# Patient Record
Sex: FEMALE | Race: WHITE | HISPANIC OR LATINO
[De-identification: names, ages, dates, MRNs, and addresses within clinical notes are randomized per-mention and may not be internally consistent; named-entity substitution may affect disease eponyms.]

---

## 2017-08-01 ENCOUNTER — RESULT REVIEW (OUTPATIENT)
Age: 27
End: 2017-08-01

## 2018-09-04 ENCOUNTER — INPATIENT (INPATIENT)
Facility: HOSPITAL | Age: 28
LOS: 1 days | Discharge: HOME | End: 2018-09-06
Attending: OBSTETRICS & GYNECOLOGY | Admitting: OBSTETRICS & GYNECOLOGY
Payer: COMMERCIAL

## 2018-09-04 VITALS — HEIGHT: 58 IN | WEIGHT: 121.25 LBS

## 2018-09-04 DIAGNOSIS — Z34.80 ENCOUNTER FOR SUPERVISION OF OTHER NORMAL PREGNANCY, UNSPECIFIED TRIMESTER: ICD-10-CM

## 2018-09-04 DIAGNOSIS — Z3A.00 WEEKS OF GESTATION OF PREGNANCY NOT SPECIFIED: ICD-10-CM

## 2018-09-04 DIAGNOSIS — O26.899 OTHER SPECIFIED PREGNANCY RELATED CONDITIONS, UNSPECIFIED TRIMESTER: ICD-10-CM

## 2018-09-04 LAB
BASOPHILS # BLD AUTO: 0 K/UL — SIGNIFICANT CHANGE UP (ref 0–0.2)
BASOPHILS NFR BLD AUTO: 0.6 % — SIGNIFICANT CHANGE UP (ref 0–2)
BLD GP AB SCN SERPL QL: NEGATIVE — SIGNIFICANT CHANGE UP
EOSINOPHIL # BLD AUTO: 0.1 K/UL — SIGNIFICANT CHANGE UP (ref 0–0.5)
EOSINOPHIL NFR BLD AUTO: 1.4 % — SIGNIFICANT CHANGE UP (ref 0–6)
HCT VFR BLD CALC: 38.2 % — SIGNIFICANT CHANGE UP (ref 34.5–45)
HGB BLD-MCNC: 12.6 G/DL — SIGNIFICANT CHANGE UP (ref 11.5–15.5)
LYMPHOCYTES # BLD AUTO: 1.5 K/UL — SIGNIFICANT CHANGE UP (ref 1–3.3)
LYMPHOCYTES # BLD AUTO: 22.6 % — SIGNIFICANT CHANGE UP (ref 13–44)
MCHC RBC-ENTMCNC: 26.3 PG — LOW (ref 27–34)
MCHC RBC-ENTMCNC: 33 GM/DL — SIGNIFICANT CHANGE UP (ref 32–36)
MCV RBC AUTO: 79.8 FL — LOW (ref 80–100)
MONOCYTES # BLD AUTO: 0.6 K/UL — SIGNIFICANT CHANGE UP (ref 0–0.9)
MONOCYTES NFR BLD AUTO: 8.8 % — SIGNIFICANT CHANGE UP (ref 2–14)
NEUTROPHILS # BLD AUTO: 4.5 K/UL — SIGNIFICANT CHANGE UP (ref 1.8–7.4)
NEUTROPHILS NFR BLD AUTO: 66.6 % — SIGNIFICANT CHANGE UP (ref 43–77)
PLATELET # BLD AUTO: 268 K/UL — SIGNIFICANT CHANGE UP (ref 150–400)
RBC # BLD: 4.79 M/UL — SIGNIFICANT CHANGE UP (ref 3.8–5.2)
RBC # FLD: 16.7 % — HIGH (ref 10.3–14.5)
RH IG SCN BLD-IMP: POSITIVE — SIGNIFICANT CHANGE UP
T PALLIDUM AB TITR SER: NEGATIVE — SIGNIFICANT CHANGE UP
WBC # BLD: 6.8 K/UL — SIGNIFICANT CHANGE UP (ref 3.8–10.5)
WBC # FLD AUTO: 6.8 K/UL — SIGNIFICANT CHANGE UP (ref 3.8–10.5)

## 2018-09-04 RX ORDER — DIBUCAINE 1 %
1 OINTMENT (GRAM) RECTAL EVERY 4 HOURS
Qty: 0 | Refills: 0 | Status: DISCONTINUED | OUTPATIENT
Start: 2018-09-04 | End: 2018-09-06

## 2018-09-04 RX ORDER — OXYTOCIN 10 UNIT/ML
4 VIAL (ML) INJECTION
Qty: 30 | Refills: 0 | Status: DISCONTINUED | OUTPATIENT
Start: 2018-09-04 | End: 2018-09-04

## 2018-09-04 RX ORDER — PRAMOXINE HYDROCHLORIDE 150 MG/15G
1 AEROSOL, FOAM RECTAL EVERY 4 HOURS
Qty: 0 | Refills: 0 | Status: DISCONTINUED | OUTPATIENT
Start: 2018-09-04 | End: 2018-09-04

## 2018-09-04 RX ORDER — AMPICILLIN TRIHYDRATE 250 MG
CAPSULE ORAL
Qty: 0 | Refills: 0 | Status: DISCONTINUED | OUTPATIENT
Start: 2018-09-04 | End: 2018-09-04

## 2018-09-04 RX ORDER — OXYCODONE HYDROCHLORIDE 5 MG/1
5 TABLET ORAL EVERY 4 HOURS
Qty: 0 | Refills: 0 | Status: DISCONTINUED | OUTPATIENT
Start: 2018-09-04 | End: 2018-09-06

## 2018-09-04 RX ORDER — AMPICILLIN TRIHYDRATE 250 MG
2 CAPSULE ORAL ONCE
Qty: 0 | Refills: 0 | Status: COMPLETED | OUTPATIENT
Start: 2018-09-04 | End: 2018-09-04

## 2018-09-04 RX ORDER — ACETAMINOPHEN 500 MG
975 TABLET ORAL EVERY 6 HOURS
Qty: 0 | Refills: 0 | Status: DISCONTINUED | OUTPATIENT
Start: 2018-09-04 | End: 2018-09-06

## 2018-09-04 RX ORDER — HYDROCORTISONE 1 %
1 OINTMENT (GRAM) TOPICAL EVERY 4 HOURS
Qty: 0 | Refills: 0 | Status: DISCONTINUED | OUTPATIENT
Start: 2018-09-04 | End: 2018-09-04

## 2018-09-04 RX ORDER — SODIUM CHLORIDE 9 MG/ML
500 INJECTION, SOLUTION INTRAVENOUS ONCE
Qty: 0 | Refills: 0 | Status: DISCONTINUED | OUTPATIENT
Start: 2018-09-04 | End: 2018-09-04

## 2018-09-04 RX ORDER — SODIUM CHLORIDE 9 MG/ML
3 INJECTION INTRAMUSCULAR; INTRAVENOUS; SUBCUTANEOUS EVERY 8 HOURS
Qty: 0 | Refills: 0 | Status: DISCONTINUED | OUTPATIENT
Start: 2018-09-04 | End: 2018-09-04

## 2018-09-04 RX ORDER — IBUPROFEN 200 MG
600 TABLET ORAL EVERY 6 HOURS
Qty: 0 | Refills: 0 | Status: DISCONTINUED | OUTPATIENT
Start: 2018-09-04 | End: 2018-09-06

## 2018-09-04 RX ORDER — DIPHENHYDRAMINE HCL 50 MG
25 CAPSULE ORAL EVERY 6 HOURS
Qty: 0 | Refills: 0 | Status: DISCONTINUED | OUTPATIENT
Start: 2018-09-04 | End: 2018-09-06

## 2018-09-04 RX ORDER — AER TRAVELER 0.5 G/1
1 SOLUTION RECTAL; TOPICAL EVERY 4 HOURS
Qty: 0 | Refills: 0 | Status: DISCONTINUED | OUTPATIENT
Start: 2018-09-04 | End: 2018-09-06

## 2018-09-04 RX ORDER — CITRIC ACID/SODIUM CITRATE 300-500 MG
15 SOLUTION, ORAL ORAL EVERY 4 HOURS
Qty: 0 | Refills: 0 | Status: DISCONTINUED | OUTPATIENT
Start: 2018-09-04 | End: 2018-09-04

## 2018-09-04 RX ORDER — PRAMOXINE HYDROCHLORIDE 150 MG/15G
1 AEROSOL, FOAM RECTAL EVERY 4 HOURS
Qty: 0 | Refills: 0 | Status: DISCONTINUED | OUTPATIENT
Start: 2018-09-04 | End: 2018-09-06

## 2018-09-04 RX ORDER — SODIUM CHLORIDE 9 MG/ML
1000 INJECTION, SOLUTION INTRAVENOUS
Qty: 0 | Refills: 0 | Status: DISCONTINUED | OUTPATIENT
Start: 2018-09-04 | End: 2018-09-04

## 2018-09-04 RX ORDER — OXYTOCIN 10 UNIT/ML
41.67 VIAL (ML) INJECTION
Qty: 20 | Refills: 0 | Status: DISCONTINUED | OUTPATIENT
Start: 2018-09-04 | End: 2018-09-06

## 2018-09-04 RX ORDER — INFLUENZA VIRUS VACCINE 15; 15; 15; 15 UG/.5ML; UG/.5ML; UG/.5ML; UG/.5ML
0.5 SUSPENSION INTRAMUSCULAR ONCE
Qty: 0 | Refills: 0 | Status: DISCONTINUED | OUTPATIENT
Start: 2018-09-04 | End: 2018-09-06

## 2018-09-04 RX ORDER — OXYTOCIN 10 UNIT/ML
333.33 VIAL (ML) INJECTION
Qty: 20 | Refills: 0 | Status: DISCONTINUED | OUTPATIENT
Start: 2018-09-04 | End: 2018-09-04

## 2018-09-04 RX ORDER — AER TRAVELER 0.5 G/1
1 SOLUTION RECTAL; TOPICAL EVERY 4 HOURS
Qty: 0 | Refills: 0 | Status: DISCONTINUED | OUTPATIENT
Start: 2018-09-04 | End: 2018-09-04

## 2018-09-04 RX ORDER — GLYCERIN ADULT
1 SUPPOSITORY, RECTAL RECTAL AT BEDTIME
Qty: 0 | Refills: 0 | Status: DISCONTINUED | OUTPATIENT
Start: 2018-09-04 | End: 2018-09-06

## 2018-09-04 RX ORDER — DOCUSATE SODIUM 100 MG
100 CAPSULE ORAL
Qty: 0 | Refills: 0 | Status: DISCONTINUED | OUTPATIENT
Start: 2018-09-04 | End: 2018-09-06

## 2018-09-04 RX ORDER — SIMETHICONE 80 MG/1
80 TABLET, CHEWABLE ORAL EVERY 6 HOURS
Qty: 0 | Refills: 0 | Status: DISCONTINUED | OUTPATIENT
Start: 2018-09-04 | End: 2018-09-06

## 2018-09-04 RX ORDER — AMPICILLIN TRIHYDRATE 250 MG
1 CAPSULE ORAL EVERY 4 HOURS
Qty: 0 | Refills: 0 | Status: DISCONTINUED | OUTPATIENT
Start: 2018-09-04 | End: 2018-09-04

## 2018-09-04 RX ORDER — ACETAMINOPHEN 500 MG
1000 TABLET ORAL EVERY 6 HOURS
Qty: 0 | Refills: 0 | Status: DISCONTINUED | OUTPATIENT
Start: 2018-09-04 | End: 2018-09-04

## 2018-09-04 RX ORDER — DIBUCAINE 1 %
1 OINTMENT (GRAM) RECTAL EVERY 4 HOURS
Qty: 0 | Refills: 0 | Status: DISCONTINUED | OUTPATIENT
Start: 2018-09-04 | End: 2018-09-04

## 2018-09-04 RX ORDER — SODIUM CHLORIDE 9 MG/ML
3 INJECTION INTRAMUSCULAR; INTRAVENOUS; SUBCUTANEOUS EVERY 8 HOURS
Qty: 0 | Refills: 0 | Status: DISCONTINUED | OUTPATIENT
Start: 2018-09-04 | End: 2018-09-06

## 2018-09-04 RX ORDER — MAGNESIUM HYDROXIDE 400 MG/1
30 TABLET, CHEWABLE ORAL
Qty: 0 | Refills: 0 | Status: DISCONTINUED | OUTPATIENT
Start: 2018-09-04 | End: 2018-09-06

## 2018-09-04 RX ORDER — LANOLIN
1 OINTMENT (GRAM) TOPICAL EVERY 6 HOURS
Qty: 0 | Refills: 0 | Status: DISCONTINUED | OUTPATIENT
Start: 2018-09-04 | End: 2018-09-06

## 2018-09-04 RX ORDER — OXYCODONE HYDROCHLORIDE 5 MG/1
5 TABLET ORAL
Qty: 0 | Refills: 0 | Status: DISCONTINUED | OUTPATIENT
Start: 2018-09-04 | End: 2018-09-06

## 2018-09-04 RX ORDER — KETOROLAC TROMETHAMINE 30 MG/ML
30 SYRINGE (ML) INJECTION ONCE
Qty: 0 | Refills: 0 | Status: DISCONTINUED | OUTPATIENT
Start: 2018-09-04 | End: 2018-09-04

## 2018-09-04 RX ORDER — TETANUS TOXOID, REDUCED DIPHTHERIA TOXOID AND ACELLULAR PERTUSSIS VACCINE, ADSORBED 5; 2.5; 8; 8; 2.5 [IU]/.5ML; [IU]/.5ML; UG/.5ML; UG/.5ML; UG/.5ML
0.5 SUSPENSION INTRAMUSCULAR ONCE
Qty: 0 | Refills: 0 | Status: DISCONTINUED | OUTPATIENT
Start: 2018-09-04 | End: 2018-09-06

## 2018-09-04 RX ORDER — HYDROCORTISONE 1 %
1 OINTMENT (GRAM) TOPICAL EVERY 4 HOURS
Qty: 0 | Refills: 0 | Status: DISCONTINUED | OUTPATIENT
Start: 2018-09-04 | End: 2018-09-06

## 2018-09-04 RX ORDER — OXYTOCIN 10 UNIT/ML
41.67 VIAL (ML) INJECTION
Qty: 20 | Refills: 0 | Status: DISCONTINUED | OUTPATIENT
Start: 2018-09-04 | End: 2018-09-04

## 2018-09-04 RX ADMIN — Medication 600 MILLIGRAM(S): at 18:15

## 2018-09-04 RX ADMIN — Medication 216 GRAM(S): at 10:20

## 2018-09-04 RX ADMIN — Medication 975 MILLIGRAM(S): at 18:17

## 2018-09-04 RX ADMIN — Medication 30 MILLIGRAM(S): at 13:08

## 2018-09-04 NOTE — PATIENT PROFILE OB - NS PRO TDAP CONTRAINDICATIONS
IP Acute Occupational Therapy Discharge Summary  Plan of Care Note  Patient seen on 2 [facility]    SUBJECTIVE: Subjective: \"I want to get dressed. I am going home today.\" (11/29/17 1539)    Diagnosis:  1. Acute respiratory failure with hypoxia (CMS/HCC)    2. Acute on chronic congestive heart failure, unspecified congestive heart failure type (CMS/HCC)    3. Hyperkalemia    4. Acute on chronic diastolic congestive heart failure (CMS/HCC)    5. Cor pulmonale, acute (CMS/HCC)        Co morbidities:   Patient Active Problem List   Diagnosis   • MALIG NEOPLASM BRONCH/LUNG NOS   • Drug-induced neutropenia (CMS/HCC)   • Other and unspecified hyperlipidemia   • Gastric cancer (CMS/HCC)   • Acute bacterial bronchitis   • Iron malabsorption   • Hypoxia   • COPD, severe (CMS/HCC)   • Acquired hemolytic anemia (CMS/HCC)   • Weight loss, unintentional   • Benign essential hypertension   • Acute on chronic diastolic congestive heart failure (CMS/HCC)       Precautions:       Prior Living Situation:  Type of Home: House (11/27/17 0900)  Lives With: Spouse (11/27/17 0900)  Bathing: Independent (11/27/17 0900)  Upper Body Dressing: Independent (11/27/17 0900)  Lower Body Dressing: Independent (11/27/17 0900)  Toileting: Independent (11/27/17 0900)    Objective:  Below is key objective and subjective information from the last 24 hours.  For further details and goals, please refer to the OT Assess/Treat/Goals flowsheet.    ADLs:  Self Cares/ADL's  Upper Body Dressing Assistance: Modified independent;Edge of bed (11/29/17 1539)  Lower Body Clothing Assistance: Modified independent;Edge of bed (11/29/17 1539)  Footwear Assistance: Modified independent;Edge of bed (11/29/17 1539)  Toileting Assistance: Modified independent (11/29/17 1539)  Self Cares/ADL's Comments #1: Provided handouts on energy conservation and pacing to minimize dyspnea with activity; wife asking if patient can use oxygen in shower and therapist confirmed  that he could; reinforced use of shower bench for seated showering; reinforced pacing and pursed lip breathing with activity (11/29/17 1539)    Household Mobility:  Household Mobility  Sit to Stand: Independent (11/29/17 1539)  Stand to Sit: Independent (11/29/17 1539)  Stand Pivot Transfers: Independent (11/29/17 1539)  Toilet Transfers: Independent (11/29/17 1539)  Sitting - Dynamic: Independent (11/29/17 1539)  Standing - Static: Independent (11/29/17 1539)  Standing - Dynamic: Independent (11/29/17 1539)  Household Mobility Comments #1: No device needed for mobility; finalized education in pacing and pursed lip breathing during room ambulation (11/29/17 1539)    Home Management:       Assessment: Patient presents near to or at baseline which was independent with mobility  and ADLs . Emphasis of session included finalizing education in pacing, energy conservation and pursed lip breathing during completion of dressing self cares, room ambulation and item retrieval skills. Patient requiring use of 5L oxygen with activity and oxygen saturation 88% to 89%. Achieved level of modified to full independence for self care task completion with occasional rest breaks due to dyspnea though no physical assist needed. Patient encouraged to use a shower bench for seated showering and verbalizes good understanding. Goals met and no further OT intervention is needed at this time. D/C OT.     OT Identified Barriers to Discharge: dyspnea    Education:   On this date, the patient and patient's spouse was educated on self care adaptations; energy conservation    The response to education was: Verbalizes understanding and Demonstrates understanding.    Equipment:  PT/OT ADL Equipment for Discharge: shower bench (11/29/17 1539)       PLAN:      Frequency Comments: D/C OT; all goals met and teaching completed; patient being discharged home later today (11/29/17 1539)      GOALS:  Short Term Goals to Be Reviewed On: 11/29/17  Goal  Agreement: Patient agrees with goals and treatment plan              Grooming Discharge Goal 1: Indpendent performed sinkside  Grooming Discharge Goal Progress 1: Outcome met, complete goal     Bathing Discharge Goal 1: Independent full body sponge bathing  Bathing Discharge Goal Progress 1: Outcome met, complete goal              Dressing Discharge Goal 1: Independent full body dressing  Dressing Discharge Goal Progress 1: Outcome met, complete goal              Toileting Discharge Goal Progress 1: Outcome met, complete goal              Home Management Discharge Goal 1: Indepenent item transport and retrieval skills while incorporating pacing and energy conservation techniques            RECOMMENDATIONS FOR DISCHARGE:  Recommendations for Discharge: OT: Home     Treatment Time:  OT Time Spent: 28 minutes (11/29/17 5654)   had less than two years ago

## 2018-09-05 LAB
HCT VFR BLD CALC: 36.2 % — SIGNIFICANT CHANGE UP (ref 34.5–45)
HGB BLD-MCNC: 11.6 G/DL — SIGNIFICANT CHANGE UP (ref 11.5–15.5)

## 2018-09-05 RX ADMIN — Medication 600 MILLIGRAM(S): at 01:15

## 2018-09-05 RX ADMIN — Medication 975 MILLIGRAM(S): at 00:35

## 2018-09-05 RX ADMIN — Medication 1 TABLET(S): at 12:14

## 2018-09-05 RX ADMIN — Medication 975 MILLIGRAM(S): at 01:15

## 2018-09-05 RX ADMIN — Medication 975 MILLIGRAM(S): at 12:13

## 2018-09-05 RX ADMIN — Medication 600 MILLIGRAM(S): at 12:14

## 2018-09-05 RX ADMIN — Medication 600 MILLIGRAM(S): at 00:36

## 2018-09-05 NOTE — PROGRESS NOTE ADULT - SUBJECTIVE AND OBJECTIVE BOX
OB Progress Note:  PPD#1    S: 27yo  PPD#1 s/p . Patient feels well. Pain is well controlled. She is tolerating a regular diet and passing flatus. She is voiding spontaneously, and ambulating without difficulty. Denies CP/SOB. Denies lightheadedness/dizziness. Denies N/V.    O:  Vitals:  Vital Signs Last 24 Hrs  T(C): 36.6 (05 Sep 2018 06:17), Max: 36.8 (04 Sep 2018 21:35)  T(F): 97.9 (05 Sep 2018 06:17), Max: 98.2 (04 Sep 2018 21:35)  HR: 70 (05 Sep 2018 06:17) (63 - 87)  BP: 92/63 (05 Sep 2018 06:17) (91/60 - 111/55)  BP(mean): --  RR: 18 (05 Sep 2018 06:17) (18 - 18)  SpO2: 98% (05 Sep 2018 00:59) (96% - 100%)    MEDICATIONS  (STANDING):  acetaminophen   Tablet .. 975 milliGRAM(s) Oral every 6 hours  diphtheria/tetanus/pertussis (acellular) Vaccine (ADAcel) 0.5 milliLiter(s) IntraMuscular once  ibuprofen  Tablet. 600 milliGRAM(s) Oral every 6 hours  influenza   Vaccine 0.5 milliLiter(s) IntraMuscular once  oxyCODONE    IR 5 milliGRAM(s) Oral every 3 hours  oxytocin Infusion 41.667 milliUNIT(s)/Min (125 mL/Hr) IV Continuous <Continuous>  prenatal multivitamin 1 Tablet(s) Oral daily  sodium chloride 0.9% lock flush 3 milliLiter(s) IV Push every 8 hours      Physical Exam:  General: NAD  Abdomen: soft, non-tender, non-distended, fundus firm  Vaginal: Lochia wnl  Extremities: No erythema/edema    Labs:  Blood type: A Positive  Rubella IgG: RPR: Negative                          12.6   6.8 >-----------< 268    (  @ 10:26 )             38.2

## 2018-09-05 NOTE — PROGRESS NOTE ADULT - PROBLEM SELECTOR PLAN 1
- Pain well controlled, continue current pain regimen  - Increase ambulation  - Continue regular diet    Angie Blackman PGY-1

## 2018-09-06 ENCOUNTER — TRANSCRIPTION ENCOUNTER (OUTPATIENT)
Age: 28
End: 2018-09-06

## 2018-09-06 VITALS
RESPIRATION RATE: 18 BRPM | SYSTOLIC BLOOD PRESSURE: 124 MMHG | HEART RATE: 63 BPM | TEMPERATURE: 98 F | DIASTOLIC BLOOD PRESSURE: 61 MMHG

## 2018-09-06 PROCEDURE — 85014 HEMATOCRIT: CPT

## 2018-09-06 PROCEDURE — 86780 TREPONEMA PALLIDUM: CPT

## 2018-09-06 PROCEDURE — 85018 HEMOGLOBIN: CPT

## 2018-09-06 PROCEDURE — 59025 FETAL NON-STRESS TEST: CPT

## 2018-09-06 PROCEDURE — 59050 FETAL MONITOR W/REPORT: CPT

## 2018-09-06 PROCEDURE — 85027 COMPLETE CBC AUTOMATED: CPT

## 2018-09-06 PROCEDURE — 86850 RBC ANTIBODY SCREEN: CPT

## 2018-09-06 PROCEDURE — 86900 BLOOD TYPING SEROLOGIC ABO: CPT

## 2018-09-06 PROCEDURE — G0463: CPT

## 2018-09-06 PROCEDURE — 86901 BLOOD TYPING SEROLOGIC RH(D): CPT

## 2018-09-06 RX ORDER — IBUPROFEN 200 MG
1 TABLET ORAL
Qty: 0 | Refills: 0 | COMMUNITY
Start: 2018-09-06

## 2018-09-06 RX ORDER — FOLIC ACID 0.8 MG
0 TABLET ORAL
Qty: 0 | Refills: 0 | COMMUNITY

## 2018-09-06 RX ORDER — FERROUS SULFATE 325(65) MG
0 TABLET ORAL
Qty: 0 | Refills: 0 | COMMUNITY

## 2018-09-06 RX ORDER — VITAMIN E 100 UNIT
0 CAPSULE ORAL
Qty: 0 | Refills: 0 | COMMUNITY

## 2018-09-06 RX ADMIN — Medication 600 MILLIGRAM(S): at 06:37

## 2018-09-06 RX ADMIN — Medication 600 MILLIGRAM(S): at 06:07

## 2018-09-06 NOTE — DISCHARGE NOTE OB - CARE PLAN
Principal Discharge DX:	Vaginal delivery  Goal:	post partum care  Assessment and plan of treatment:	routine care

## 2018-09-06 NOTE — DISCHARGE NOTE OB - PATIENT PORTAL LINK FT
You can access the [facility] Patient Portal, offered by [facility], by registering with the following website: [email or web address]

## 2018-09-06 NOTE — DISCHARGE NOTE OB - MATERIALS PROVIDED
Vaccinations/Breastfeeding Log/Bottle Feeding Log/[facility] Hearing Screen Program/Breastfeeding Mother’s Support Group Information/Guide to Postpartum Care/Breastfeeding Guide and Packet/Birth Certificate Instructions/[facility] [place] Screening Program/  Immunization Record/Shaken Baby Prevention Handout/Back To Sleep Handout

## 2018-09-06 NOTE — DISCHARGE NOTE OB - CARE PROVIDER_API CALL
Jos Steve (MD), Obstetrics and Gynecology  [address] [place] [address]  Phone: [phone]  Fax: [phone]

## 2018-12-27 NOTE — PATIENT PROFILE OB - BABY A: DATE/TIME OF DELIVERY
abd pain constipation saw pmd this am told to "drink" something to help constipation, developed pain afterwards 04-Sep-2018 12:22

## 2022-09-08 NOTE — PROGRESS NOTE ADULT - PROBLEM/PLAN-1
@2000 pt taken over awake in bed unresponsive, fl-acc 0, No sedation in progress. Eyes open no tracking ,no focusing. OG tube in-situ infusing tube feeds. Lyle in-situ draining urine. Assessment completed and charted in relevant flow sheets. Nursing management continues. @0000 pt reassessed no changes. @0400 reassessment completed no new developments. @5558 Bedside and Verbal shift change report given to Crys Carmichael (oncoming nurse) by Beto Wei (offgoing nurse). Report included the following information SBAR, Kardex, Intake/Output, MAR, Recent Results, Med Rec Status, and Alarm Parameters . DISPLAY PLAN FREE TEXT